# Patient Record
Sex: FEMALE | Race: WHITE | URBAN - METROPOLITAN AREA
[De-identification: names, ages, dates, MRNs, and addresses within clinical notes are randomized per-mention and may not be internally consistent; named-entity substitution may affect disease eponyms.]

---

## 2017-03-23 ENCOUNTER — TELEPHONE (OUTPATIENT)
Dept: FAMILY MEDICINE | Facility: CLINIC | Age: 32
End: 2017-03-23

## 2017-03-23 DIAGNOSIS — Z30.40 ENCOUNTER FOR SURVEILLANCE OF CONTRACEPTIVES: ICD-10-CM

## 2017-03-23 RX ORDER — LEVONORGESTREL/ETHIN.ESTRADIOL 0.1-0.02MG
1 TABLET ORAL DAILY
Qty: 84 TABLET | Refills: 2 | Status: SHIPPED | OUTPATIENT
Start: 2017-03-23 | End: 2017-11-07

## 2017-11-07 DIAGNOSIS — Z30.09 ENCOUNTER FOR OTHER GENERAL COUNSELING OR ADVICE ON CONTRACEPTION: ICD-10-CM

## 2017-11-13 RX ORDER — LEVONORGESTREL/ETHIN.ESTRADIOL 0.1-0.02MG
TABLET ORAL
Qty: 84 TABLET | Refills: 0 | Status: SHIPPED | OUTPATIENT
Start: 2017-11-13 | End: 2018-01-31

## 2017-11-13 NOTE — TELEPHONE ENCOUNTER
Left message to call back. Attempting to schedule office visit as patient is due. Routing to PCP.    Dr. Almanza,  Patient is due to be seen for medication review, ok to refill for the dispensed 84 days?  CAMILLA Duffy

## 2018-01-31 DIAGNOSIS — Z30.09 ENCOUNTER FOR OTHER GENERAL COUNSELING OR ADVICE ON CONTRACEPTION: ICD-10-CM

## 2018-01-31 RX ORDER — LEVONORGESTREL/ETHIN.ESTRADIOL 0.1-0.02MG
1 TABLET ORAL DAILY
Qty: 30 TABLET | Refills: 0 | Status: SHIPPED | OUTPATIENT
Start: 2018-01-31 | End: 2019-01-17 | Stop reason: SINTOL

## 2018-01-31 NOTE — TELEPHONE ENCOUNTER
"Requested Prescriptions   Pending Prescriptions Disp Refills     levonorgestrel-ethinyl estradiol (AVIANE,ALESSE,LESSINA) 0.1-20 MG-MCG per tablet [Pharmacy Med Name: L-JOANNA/ETH ES TABS 28'S 0.1/20]  Last Written Prescription Date:  11/13/2017  Last Fill Quantity: 84,  # refills: 0   Last Office Visit with [facility] provider:  12/19/2017   Future Office Visit:      84 tablet 0     Sig: TAKE 1 TABLET DAILY    Contraceptives Protocol Failed    1/31/2018 12:22 AM       Failed - Recent or future visit with authorizing provider's specialty    Patient had office visit in the last year or has a visit in the next 30 days with authorizing provider.  See \"Patient Info\" tab in inbasket, or \"Choose Columns\" in Meds & Orders section of the refill encounter.            Passed - Patient is not a current smoker if age is 35 or older       Passed - No active pregnancy on record       Passed - No positive pregnancy test in past 12 months        Fabrice Vincent RT (R)    "

## 2018-07-30 ENCOUNTER — TELEPHONE (OUTPATIENT)
Dept: FAMILY MEDICINE | Facility: CLINIC | Age: 33
End: 2018-07-30

## 2018-07-30 ENCOUNTER — NURSE TRIAGE (OUTPATIENT)
Dept: NURSING | Facility: CLINIC | Age: 33
End: 2018-07-30

## 2018-07-30 ENCOUNTER — OFFICE VISIT (OUTPATIENT)
Dept: FAMILY MEDICINE | Facility: CLINIC | Age: 33
End: 2018-07-30
Payer: COMMERCIAL

## 2018-07-30 VITALS
DIASTOLIC BLOOD PRESSURE: 86 MMHG | BODY MASS INDEX: 24.83 KG/M2 | TEMPERATURE: 100 F | RESPIRATION RATE: 18 BRPM | WEIGHT: 158.2 LBS | HEIGHT: 67 IN | SYSTOLIC BLOOD PRESSURE: 110 MMHG | HEART RATE: 116 BPM | OXYGEN SATURATION: 99 %

## 2018-07-30 DIAGNOSIS — R50.9 FEVER, UNSPECIFIED FEVER CAUSE: ICD-10-CM

## 2018-07-30 DIAGNOSIS — N30.00 ACUTE CYSTITIS WITHOUT HEMATURIA: ICD-10-CM

## 2018-07-30 DIAGNOSIS — B27.90 MONONUCLEOSIS: Primary | ICD-10-CM

## 2018-07-30 DIAGNOSIS — D69.6 TEMPORARY LOW PLATELET COUNT (H): ICD-10-CM

## 2018-07-30 LAB
ALBUMIN SERPL-MCNC: 3.4 G/DL (ref 3.4–5)
ALBUMIN UR-MCNC: 30 MG/DL
ALP SERPL-CCNC: 125 U/L (ref 40–150)
ALT SERPL W P-5'-P-CCNC: 104 U/L (ref 0–50)
ANION GAP SERPL CALCULATED.3IONS-SCNC: 6 MMOL/L (ref 3–14)
APPEARANCE UR: ABNORMAL
AST SERPL W P-5'-P-CCNC: 104 U/L (ref 0–45)
BACTERIA #/AREA URNS HPF: ABNORMAL /HPF
BASOPHILS # BLD AUTO: 0 10E9/L (ref 0–0.2)
BASOPHILS NFR BLD AUTO: 0.5 %
BILIRUB SERPL-MCNC: 0.4 MG/DL (ref 0.2–1.3)
BILIRUB UR QL STRIP: ABNORMAL
BUN SERPL-MCNC: 6 MG/DL (ref 7–30)
CALCIUM SERPL-MCNC: 8.2 MG/DL (ref 8.5–10.1)
CHLORIDE SERPL-SCNC: 105 MMOL/L (ref 94–109)
CO2 SERPL-SCNC: 26 MMOL/L (ref 20–32)
COLOR UR AUTO: ABNORMAL
CREAT SERPL-MCNC: 0.82 MG/DL (ref 0.52–1.04)
DIFFERENTIAL METHOD BLD: ABNORMAL
EOSINOPHIL # BLD AUTO: 0 10E9/L (ref 0–0.7)
EOSINOPHIL NFR BLD AUTO: 0 %
ERYTHROCYTE [DISTWIDTH] IN BLOOD BY AUTOMATED COUNT: 11.7 % (ref 10–15)
ERYTHROCYTE [SEDIMENTATION RATE] IN BLOOD BY WESTERGREN METHOD: 15 MM/H (ref 0–20)
GFR SERPL CREATININE-BSD FRML MDRD: 82 ML/MIN/1.7M2
GLUCOSE SERPL-MCNC: 76 MG/DL (ref 70–99)
GLUCOSE UR STRIP-MCNC: NEGATIVE MG/DL
HCT VFR BLD AUTO: 40.2 % (ref 35–47)
HETEROPH AB SER QL: POSITIVE
HGB BLD-MCNC: 13.6 G/DL (ref 11.7–15.7)
HGB UR QL STRIP: NEGATIVE
IMM GRANULOCYTES # BLD: 0 10E9/L (ref 0–0.4)
IMM GRANULOCYTES NFR BLD: 0.7 %
KETONES UR STRIP-MCNC: 15 MG/DL
LEUKOCYTE ESTERASE UR QL STRIP: ABNORMAL
LYMPHOCYTES # BLD AUTO: 1.6 10E9/L (ref 0.8–5.3)
LYMPHOCYTES NFR BLD AUTO: 35.3 %
MCH RBC QN AUTO: 30.2 PG (ref 26.5–33)
MCHC RBC AUTO-ENTMCNC: 33.8 G/DL (ref 31.5–36.5)
MCV RBC AUTO: 89 FL (ref 78–100)
MONOCYTES # BLD AUTO: 0.2 10E9/L (ref 0–1.3)
MONOCYTES NFR BLD AUTO: 5 %
MUCOUS THREADS #/AREA URNS LPF: PRESENT /LPF
NEUTROPHILS # BLD AUTO: 2.6 10E9/L (ref 1.6–8.3)
NEUTROPHILS NFR BLD AUTO: 58.5 %
NITRATE UR QL: NEGATIVE
NON-SQ EPI CELLS #/AREA URNS LPF: ABNORMAL /LPF
NRBC # BLD AUTO: 0 10*3/UL
NRBC BLD AUTO-RTO: 0 /100
PH UR STRIP: 6 PH (ref 5–7)
PLATELET # BLD AUTO: 110 10E9/L (ref 150–450)
PLATELET # BLD EST: ABNORMAL 10*3/UL
POTASSIUM SERPL-SCNC: 4.6 MMOL/L (ref 3.4–5.3)
PROT SERPL-MCNC: 7.5 G/DL (ref 6.8–8.8)
RBC # BLD AUTO: 4.5 10E12/L (ref 3.8–5.2)
RBC #/AREA URNS AUTO: ABNORMAL /HPF
SODIUM SERPL-SCNC: 137 MMOL/L (ref 133–144)
SOURCE: ABNORMAL
SP GR UR STRIP: 1.03 (ref 1–1.03)
UROBILINOGEN UR STRIP-ACNC: 1 EU/DL (ref 0.2–1)
VARIANT LYMPHS BLD QL SMEAR: PRESENT
WBC # BLD AUTO: 4.4 10E9/L (ref 4–11)
WBC #/AREA URNS AUTO: ABNORMAL /HPF

## 2018-07-30 PROCEDURE — 85025 COMPLETE CBC W/AUTO DIFF WBC: CPT | Performed by: NURSE PRACTITIONER

## 2018-07-30 PROCEDURE — 87086 URINE CULTURE/COLONY COUNT: CPT | Performed by: NURSE PRACTITIONER

## 2018-07-30 PROCEDURE — 86308 HETEROPHILE ANTIBODY SCREEN: CPT | Performed by: NURSE PRACTITIONER

## 2018-07-30 PROCEDURE — 85652 RBC SED RATE AUTOMATED: CPT | Performed by: NURSE PRACTITIONER

## 2018-07-30 PROCEDURE — 99214 OFFICE O/P EST MOD 30 MIN: CPT | Performed by: NURSE PRACTITIONER

## 2018-07-30 PROCEDURE — 86618 LYME DISEASE ANTIBODY: CPT | Performed by: NURSE PRACTITIONER

## 2018-07-30 PROCEDURE — 80053 COMPREHEN METABOLIC PANEL: CPT | Performed by: NURSE PRACTITIONER

## 2018-07-30 PROCEDURE — 36415 COLL VENOUS BLD VENIPUNCTURE: CPT | Performed by: NURSE PRACTITIONER

## 2018-07-30 PROCEDURE — 81001 URINALYSIS AUTO W/SCOPE: CPT | Performed by: NURSE PRACTITIONER

## 2018-07-30 RX ORDER — CEPHALEXIN 500 MG/1
500 CAPSULE ORAL 2 TIMES DAILY
Qty: 20 CAPSULE | Refills: 0 | Status: SHIPPED | OUTPATIENT
Start: 2018-07-30 | End: 2019-01-17

## 2018-07-30 NOTE — LETTER
[facility]  [address] [place] [id number]  Phone: [phone]  Fax: [phone]    July 30, 2018        Mahogany Sainz  [address] [place] [id number]          To whom it may concern:    RE: Mahogany Sainz    Patient was seen and treated today at our clinic and missed work.    Can return to work once fever free.    Please contact me for questions or concerns.      Sincerely,        TRISTEN Isaac CNP

## 2018-07-30 NOTE — TELEPHONE ENCOUNTER
Mahogany seen in clinic today per ISAAC RAMON CNP and diagnosed with both UTI and mono.  Mahogany concerned, states she forgot to tell the provider she has had a previous diagnosis of mono.  Wondering if she could be pregnant ?  Requesting a pregnancy test be added on to labs ?  Patient prefers this over getting a home pregnancy kit.  Can you please f/u with Mahogany, if this is possible and if it is or is not ordered?      Thanks  Sheree Rios RN  FNA

## 2018-07-30 NOTE — PATIENT INSTRUCTIONS
"Return the next week to to 1 to 2 weeks for repeat platelet count was noted to be mildly low today we will need to repeat that most likely related to the mononucleosis.    Antibiotics as directed   Urine culture is pending, will contact you if there is a change in treatment therapy.   Drink plenty of fluids. Prevention and treatment of UTI's discussed.   Signs and symptoms of pyelonephritis mentioned.   RTC if any worsening symptoms or if not improving as expected.   Follow-up with your primary care provider next week and as needed.    Indications for emergent return to emergency department discussed with patient, who verbalized good understanding and agreement.  Patient understands the limitations of today's evaluation.            * BLADDER INFECTION,Female (Adult)    A bladder infection (\"cystitis\" or \"UTI\") usually causes a constant urge to urinate and a burning when passing urine. Urine may be cloudy, smelly or dark. There may be pain in the lower abdomen. A bladder infection occurs when bacteria from the vaginal area enter the bladder opening (urethra). This can occur from sexual intercourse, wearing tight clothing, dehydration and other factors.  HOME CARE:  1. Drink lots of fluids (at least 6-8 glasses a day, unless you must restrict fluids for other medical reasons). This will force the medicine into your urinary system and flush the bacteria out of your body. Cranberry juice has been shown to help clear out the bacteria.  2. Avoid sexual intercourse until your symptoms are gone.  3. A bladder infection is treated with antibiotics. You may also be given Pyridium (generic = phenazopyridine) to reduce the burning sensation. This medicine will cause your urine to become a bright orange color. The orange urine may stain clothing. You may wear a pad or panty-liner to protect clothing.  PREVENTING FUTURE INFECTIONS:  1. Always wipe from front to back after a bowel movement.  2. Keep the genital area clean and " dry.  3. Drink plenty of fluids each day to avoid dehydration.  4. Urinate right after intercourse to flush out the bladder.  5. Wear cotton underwear and cotton-lined panty hose; avoid tight-fitting pants.  6. If you are on birth control pills and are having frequent bladder infections, discuss with your doctor.  FOLLOW UP: Return to this facility or see your doctor if ALL symptoms are not gone after three days of treatment.  GET PROMPT MEDICAL ATTENTION if any of the following occur:    Fever over 101 F (38.3 C)    No improvement by the third day of treatment    Increasing back or abdominal pain    Repeated vomiting; unable to keep medicine down    Weakness, dizziness or fainting    Vaginal discharge    Pain, redness or swelling in the labia (outer vaginal area)    3018-0005 [employer]. [address] [place] [address], [place] [id number]. All rights reserved. This information is not intended as a substitute for professional medical care. Always follow your healthcare professional's instructions.  This information has been modified by your health care provider with permission from the publisher.    Mononucleosis (Blood)  Does this test have other names?  Mono test, monospot test, Sudhakar-Barr test   What is this test?  This test looks for signs in your blood that you have the Sudhakar-Barr virus.  The Sudhakar-Barr virus (EBV) is a common virus that's part of the herpes virus family. It causes infectious mononucleosis, or mono. Mono is passed from person to person through saliva. Symptoms usually appear within 4 to 6 weeks after exposure and ease in 1 to 2 months.  If you have mono, you may have a high level of a type of white blood cell called a lymphocyte in your blood. Your immune system also will make heterophile antibodies to fight off the EBV. These antibodies will also appear in your blood if you have mono.  Why do I need this test?  You may need this blood test if your healthcare provider suspects that  you have mononucleosis. Signs and symptoms include:    Fatigue or exhaustion    Enlarged spleen    Swollen glands    Sore throat    Fever  What other tests might I have along with this test?  Your healthcare provider may also order a test for EBV-specific antibodies to confirm the results of your blood test and get a definitive mono diagnosis.  Your healthcare provider may also order a complete blood count (CBC) and chest X-ray.  What do my test results mean?  Test results may vary depending on your age, gender, health history, the method used for the test, and other things. Your test results may not mean you have a problem. Ask your healthcare provider what your test results mean for you.   Results are given in micrograms per liter (mcg/L). Normal ranges for lymphocytes are 1,000 to 4,800 mcg/L. The normal value for heterophile antibodies is zero.  If you have high levels of lymphocytes and heterophile antibodies are found, it means that you may have mononucleosis.  How is this test done?  The test is done with a blood sample. A needle is used to draw blood from a vein in your arm or hand.   Does this test pose any risks?  Having a blood test with a needle carries some risks. These include bleeding, infection, bruising, and feeling lightheaded. When the needle pricks your arm or hand, you may feel a slight sting or pain. Afterward, the site may be sore.   What might affect my test results?  HIV, lupus, lymphoma, rubella, hepatitis, and other viral infections may cause a false-positive result.  How do I get ready for this test?  You don't need to prepare for this test. Be sure your healthcare provider knows about all medicines, herbs, vitamins, and supplements you are taking. This includes medicines that don't need a prescription and any illicit drugs you may use.    2652-2372 [employer]. [address] [place] [address], [place] [address]. All rights reserved. This information is not intended as a substitute  for professional medical care. Always follow your healthcare professional's instructions.        Mononucleosis  Mononucleosis (also called mono) is a contagious viral infection. Most infants and children exposed to the virus get only mild flu-like symptoms or no symptoms at all. However, infection is usually more serious in teens and young adults. While the virus is active it causes symptoms and can spread to others. After symptoms subside, the virus stays in the body and eventually becomes inactive. Once you have one case of mono, you are unlikely to develop symptoms again.  The virus is usually spread by contact with saliva, often by kissing. It may also spread by breastmilk, blood, or sexual contact. It takes about 4 to 6 weeks to develop symptoms after exposure.  Early symptoms include headache, nausea, tiredness and general muscle aching. This is followed by sore throat and fever. Lymph glands in the neck, under the arms, or in the groin may be swollen. Symptoms usually go away in about 1 to 2 months. But they can last up to four months.  If symptoms have been present less than 1 week or more than 3 weeks, the blood test used to diagnose this disease may be negative even though you have the illness.  In this case, other tests may be done.  Taking the antibiotics ampicillin or amoxicillin during a mono infection may cause a skin rash. This is not serious and will fade in about one week. The cause is a reaction of the drug with the virus.  Mono can cause your spleen to swell. The spleen is a fist-sized organ in the upper left abdomen that stores red blood cells. Injury to a swollen spleen can cause the spleen to rupture. This can cause life-threatening internal bleeding. To avoid this, do not play contact sports or perform strenuous activity for 8 weeks, or until cleared by your healthcare provider. A sharp blow could rupture a swollen spleen  Home care    Rest in bed until the fever and weakness have gone  away.    Drink plenty of fluids, but avoid alcohol. Otherwise, you may eat a regular diet.    Ask your healthcare provider about using over-the-counter medicines to treat symptoms such as fever, pain, or an itchy rash.    Over-the-counter throat lozenges may help soothe a sore throat. Gargling with warm salt water (1/2 teaspoon in 1 glass of warm water) may also be soothing to the throat.    You may return to work or school after the fever goes away and you are feeling better. Continue to follow any activity restrictions you have been given.  Preventing spread of the virus  To limit the spread of the virus, avoid exposing others to your saliva for at least 6 months after your illness (no kissing or sharing utensils, drinking glasses, or toothbrushes).  Follow-up care  Follow up with your healthcare provider within 1 to 2 weeks or as advised by our staff to be sure that there are no complications. If symptoms of extreme fatigue and swollen glands last longer than 6 months, see your healthcare provider for further testing.  When to seek medical advice  Call your healthcare provider right away if any of the following occur:    Excessive coughing    Yellow skin or eyes    Trouble swallowing  Call 911  Call 911 if any of the following occur:    Severe or worsening abdominal pain    Trouble breathing  Date Last Reviewed: 9/25/2015 2000-2017 [employer]. [address] [place] [address], [place] [address]. All rights reserved. This information is not intended as a substitute for professional medical care. Always follow your healthcare professional's instructions.

## 2018-07-30 NOTE — MR AVS SNAPSHOT
"              After Visit Summary   7/30/2018    Mahogany Sainz    MRN: [id number]           Patient Information     Date Of Birth          1985        Visit Information        Provider Department      7/30/2018 11:40 AM Celi Larose APRN [facility]        Today's Diagnoses     Fever, unspecified fever cause    -  1    Acute cystitis without hematuria        Mononucleosis        Temporary low platelet count (H)          Care Instructions    Return the next week to to 1 to 2 weeks for repeat platelet count was noted to be mildly low today we will need to repeat that most likely related to the mononucleosis.    Antibiotics as directed   Urine culture is pending, will contact you if there is a change in treatment therapy.   Drink plenty of fluids. Prevention and treatment of UTI's discussed.   Signs and symptoms of pyelonephritis mentioned.   RTC if any worsening symptoms or if not improving as expected.   Follow-up with your primary care provider next week and as needed.    Indications for emergent return to emergency department discussed with patient, who verbalized good understanding and agreement.  Patient understands the limitations of today's evaluation.            * BLADDER INFECTION,Female (Adult)    A bladder infection (\"cystitis\" or \"UTI\") usually causes a constant urge to urinate and a burning when passing urine. Urine may be cloudy, smelly or dark. There may be pain in the lower abdomen. A bladder infection occurs when bacteria from the vaginal area enter the bladder opening (urethra). This can occur from sexual intercourse, wearing tight clothing, dehydration and other factors.  HOME CARE:  1. Drink lots of fluids (at least 6-8 glasses a day, unless you must restrict fluids for other medical reasons). This will force the medicine into your urinary system and flush the bacteria out of your body. Cranberry juice has been shown to help clear out the bacteria.  2. Avoid " sexual intercourse until your symptoms are gone.  3. A bladder infection is treated with antibiotics. You may also be given Pyridium (generic = phenazopyridine) to reduce the burning sensation. This medicine will cause your urine to become a bright orange color. The orange urine may stain clothing. You may wear a pad or panty-liner to protect clothing.  PREVENTING FUTURE INFECTIONS:  1. Always wipe from front to back after a bowel movement.  2. Keep the genital area clean and dry.  3. Drink plenty of fluids each day to avoid dehydration.  4. Urinate right after intercourse to flush out the bladder.  5. Wear cotton underwear and cotton-lined panty hose; avoid tight-fitting pants.  6. If you are on birth control pills and are having frequent bladder infections, discuss with your doctor.  FOLLOW UP: Return to this facility or see your doctor if ALL symptoms are not gone after three days of treatment.  GET PROMPT MEDICAL ATTENTION if any of the following occur:    Fever over 101 F (38.3 C)    No improvement by the third day of treatment    Increasing back or abdominal pain    Repeated vomiting; unable to keep medicine down    Weakness, dizziness or fainting    Vaginal discharge    Pain, redness or swelling in the labia (outer vaginal area)    9581-0903 [employer]. [address] [place] [address], [place] [address]. All rights reserved. This information is not intended as a substitute for professional medical care. Always follow your healthcare professional's instructions.  This information has been modified by your health care provider with permission from the publisher.    Mononucleosis (Blood)  Does this test have other names?  Mono test, monospot test, Sudhakar-Barr test   What is this test?  This test looks for signs in your blood that you have the Sudhakar-Barr virus.  The Sudhakar-Barr virus (EBV) is a common virus that's part of the herpes virus family. It causes infectious mononucleosis, or mono. Mono is  passed from person to person through saliva. Symptoms usually appear within 4 to 6 weeks after exposure and ease in 1 to 2 months.  If you have mono, you may have a high level of a type of white blood cell called a lymphocyte in your blood. Your immune system also will make heterophile antibodies to fight off the EBV. These antibodies will also appear in your blood if you have mono.  Why do I need this test?  You may need this blood test if your healthcare provider suspects that you have mononucleosis. Signs and symptoms include:    Fatigue or exhaustion    Enlarged spleen    Swollen glands    Sore throat    Fever  What other tests might I have along with this test?  Your healthcare provider may also order a test for EBV-specific antibodies to confirm the results of your blood test and get a definitive mono diagnosis.  Your healthcare provider may also order a complete blood count (CBC) and chest X-ray.  What do my test results mean?  Test results may vary depending on your age, gender, health history, the method used for the test, and other things. Your test results may not mean you have a problem. Ask your healthcare provider what your test results mean for you.   Results are given in micrograms per liter (mcg/L). Normal ranges for lymphocytes are 1,000 to 4,800 mcg/L. The normal value for heterophile antibodies is zero.  If you have high levels of lymphocytes and heterophile antibodies are found, it means that you may have mononucleosis.  How is this test done?  The test is done with a blood sample. A needle is used to draw blood from a vein in your arm or hand.   Does this test pose any risks?  Having a blood test with a needle carries some risks. These include bleeding, infection, bruising, and feeling lightheaded. When the needle pricks your arm or hand, you may feel a slight sting or pain. Afterward, the site may be sore.   What might affect my test results?  HIV, lupus, lymphoma, rubella, hepatitis, and  other viral infections may cause a false-positive result.  How do I get ready for this test?  You don't need to prepare for this test. Be sure your healthcare provider knows about all medicines, herbs, vitamins, and supplements you are taking. This includes medicines that don't need a prescription and any illicit drugs you may use.    4580-7704 [employer]. [address] [place] [address]. All rights reserved. This information is not intended as a substitute for professional medical care. Always follow your healthcare professional's instructions.        Mononucleosis  Mononucleosis (also called mono) is a contagious viral infection. Most infants and children exposed to the virus get only mild flu-like symptoms or no symptoms at all. However, infection is usually more serious in teens and young adults. While the virus is active it causes symptoms and can spread to others. After symptoms subside, the virus stays in the body and eventually becomes inactive. Once you have one case of mono, you are unlikely to develop symptoms again.  The virus is usually spread by contact with saliva, often by kissing. It may also spread by breastmilk, blood, or sexual contact. It takes about 4 to 6 weeks to develop symptoms after exposure.  Early symptoms include headache, nausea, tiredness and general muscle aching. This is followed by sore throat and fever. Lymph glands in the neck, under the arms, or in the groin may be swollen. Symptoms usually go away in about 1 to 2 months. But they can last up to four months.  If symptoms have been present less than 1 week or more than 3 weeks, the blood test used to diagnose this disease may be negative even though you have the illness.  In this case, other tests may be done.  Taking the antibiotics ampicillin or amoxicillin during a mono infection may cause a skin rash. This is not serious and will fade in about one week. The cause is a reaction of the drug with the  virus.  Mono can cause your spleen to swell. The spleen is a fist-sized organ in the upper left abdomen that stores red blood cells. Injury to a swollen spleen can cause the spleen to rupture. This can cause life-threatening internal bleeding. To avoid this, do not play contact sports or perform strenuous activity for 8 weeks, or until cleared by your healthcare provider. A sharp blow could rupture a swollen spleen  Home care    Rest in bed until the fever and weakness have gone away.    Drink plenty of fluids, but avoid alcohol. Otherwise, you may eat a regular diet.    Ask your healthcare provider about using over-the-counter medicines to treat symptoms such as fever, pain, or an itchy rash.    Over-the-counter throat lozenges may help soothe a sore throat. Gargling with warm salt water (1/2 teaspoon in 1 glass of warm water) may also be soothing to the throat.    You may return to work or school after the fever goes away and you are feeling better. Continue to follow any activity restrictions you have been given.  Preventing spread of the virus  To limit the spread of the virus, avoid exposing others to your saliva for at least 6 months after your illness (no kissing or sharing utensils, drinking glasses, or toothbrushes).  Follow-up care  Follow up with your healthcare provider within 1 to 2 weeks or as advised by our staff to be sure that there are no complications. If symptoms of extreme fatigue and swollen glands last longer than 6 months, see your healthcare provider for further testing.  When to seek medical advice  Call your healthcare provider right away if any of the following occur:    Excessive coughing    Yellow skin or eyes    Trouble swallowing  Call 911  Call 911 if any of the following occur:    Severe or worsening abdominal pain    Trouble breathing  Date Last Reviewed: 9/25/2015 2000-2017 [employer]. [address] [place] [address], [place] [address]. All rights reserved. This  "information is not intended as a substitute for professional medical care. Always follow your healthcare professional's instructions.                Follow-ups after your visit        Future tests that were ordered for you today     Open Future Orders        Priority Expected Expires Ordered    **CBC with platelets FUTURE 14d Routine 8/6/2018 8/13/2018 7/30/2018            Who to contact     If you have questions or need follow up information about today's clinic visit or your schedule please contact [facility] directly at [phone].  Normal or non-critical lab and imaging results will be communicated to you by MyChart, letter or phone within 4 business days after the clinic has received the results. If you do not hear from us within 7 days, please contact the clinic through MyChart or phone. If you have a critical or abnormal lab result, we will notify you by phone as soon as possible.  Submit refill requests through [employer] or call your pharmacy and they will forward the refill request to us. Please allow 3 business days for your refill to be completed.          Additional Information About Your Visit        Care EveryWhere ID     This is your Care EveryWhere ID. This could be used by other organizations to access your [facility] medical records  [id number]        Your Vitals Were     Pulse Temperature Respirations Height Last Period Pulse Oximetry    116 100  F (37.8  C) (Tympanic) 18 5' 7.25\" (1.708 m) 07/15/2018 (Approximate) 99%    BMI (Body Mass Index)                   24.59 kg/m2            Blood Pressure from Last 3 Encounters:   07/30/18 110/86   12/19/16 134/88   12/01/15 128/78    Weight from Last 3 Encounters:   07/30/18 158 lb 3.2 oz (71.8 kg)   12/19/16 183 lb (83 kg)   12/01/15 175 lb (79.4 kg)              We Performed the Following     CBC with platelets differential     Comprehensive metabolic panel     ESR: Erythrocyte sedimentation rate     Lyme Disease Essence with reflex to " WB Serum     Mononucleosis screen     UA reflex to Microscopic and Culture     Urine Culture Aerobic Bacterial     Urine Microscopic          Today's Medication Changes          These changes are accurate as of 7/30/18 12:49 PM.  If you have any questions, ask your nurse or doctor.               Start taking these medicines.        Dose/Directions    cephALEXin 500 MG capsule   Commonly known as:  KEFLEX   Used for:  Mononucleosis   Started by:  Celi Larose APRN CNP        Dose:  500 mg   Take 1 capsule (500 mg) by mouth 2 times daily   Quantity:  20 capsule   Refills:  0            Where to get your medicines      These medications were sent to [id number] [address] [facility]  [address] [place] [address]    Hours:  Closed 10-16-08 business to [facility] Phone:  [phone]     cephALEXin 500 MG capsule                Primary Care Provider Office Phone # Fax #    [facility] [phone] [address] [place] [address]        Equal Access to Services     JEANNA PALACIOS AH: Hadii parag birch hadasho Soomaali, waaxda luqadaha, qaybta kaalmada adeegyada, waxay susanin penny beebe . So [facility] [phone].    ATENCIÓN: Si miguel angel mari, tiene a roy disposición servicios gratuitos de asistencia lingüística. Llame al [phone].    We comply with applicable federal civil rights laws and [place] laws. We do not discriminate on the basis of race, color, national origin, age, disability, sex, sexual orientation, or gender identity.            Thank you!     Thank you for choosing [facility]  for your care. Our goal is always to provide you with excellent care. Hearing back from our patients is one way we can continue to improve our services. Please take a few minutes to complete the written survey that you may receive in the mail after your visit with us. Thank you!             Your Updated Medication  List - Protect others around you: Learn how to safely use, store and throw away your medicines at www.disposemymeds.org.          This list is accurate as of 7/30/18 12:49 PM.  Always use your most recent med list.                   Brand Name Dispense Instructions for use Diagnosis    atovaquone-proguanil 250-100 MG per tablet    MALARONE    50 tablet    Take 1 tablet by mouth daily Start taking 1-2 days before travel and then all through the stay in country and 7 days on return.    Travel advice encounter       cephALEXin 500 MG capsule    KEFLEX    20 capsule    Take 1 capsule (500 mg) by mouth 2 times daily    Mononucleosis       levonorgestrel-ethinyl estradiol 0.1-20 MG-MCG per tablet    AVIANE,ROXY,LESSINA    30 tablet    Take 1 tablet by mouth daily (Needs follow-up appointment for this medication)    Encounter for other general counseling or advice on contraception       * nicotine 21 MG/24HR 24 hr patch    NICODERM CQ    30 patch    Place 1 patch onto the skin every 24 hours        * nicotine 14 MG/24HR 24 hr patch    NICODERM CQ    30 patch    Place 1 patch onto the skin every 24 hours    Encounter for smoking cessation counseling       nicotine polacrilex 2 MG lozenge    [employer] NICOTINE POLACRILEX    360 lozenge    Place 1 lozenge (2 mg) inside cheek every hour as needed for smoking cessation    Encounter for smoking cessation counseling       typhoid CR capsule    VIVOTIF    4 capsule    Take 1 capsule by mouth every other day    Travel advice encounter       * Notice:  This list has 2 medication(s) that are the same as other medications prescribed for you. Read the directions carefully, and ask your doctor or other care provider to review them with you.

## 2018-07-30 NOTE — NURSING NOTE
"Chief Complaint   Patient presents with     Fever       Initial /86  Pulse 116  Temp 100  F (37.8  C) (Tympanic)  Resp 18  Ht 5' 7.25\" (1.708 m)  Wt 158 lb 3.2 oz (71.8 kg)  LMP 07/15/2018 (Approximate)  SpO2 99%  BMI 24.59 kg/m2 Estimated body mass index is 24.59 kg/(m^2) as calculated from the following:    Height as of this encounter: 5' 7.25\" (1.708 m).    Weight as of this encounter: 158 lb 3.2 oz (71.8 kg).      Health Maintenance that is potentially due pending provider review:  NONE    n/a    Is there anyone who you would like to be able to receive your results? No  If yes have patient fill out LUZ      "

## 2018-07-30 NOTE — TELEPHONE ENCOUNTER
Seen in clinic today, message routed to treating provider.  See telephone encounter.    Reason for Disposition    [1] Caller requesting NON-URGENT health information AND [2] PCP's office is the best resource    Protocols used: INFORMATION ONLY CALL-ADULT-

## 2018-07-30 NOTE — PROGRESS NOTES
SUBJECTIVE:   Mahogany Sainz is a 32 year old female who presents to clinic today for the following health issues:      Fever       Duration: Thursday night     Description (location/character/radiation): fever     Intensity:  moderate, severe    Accompanying signs and symptoms: chills/sweats, body aches, no sore throat, some nausea the first day, no congestion or runny nose, no ear pain, headache, fatigue, body pain     History (similar episodes/previous evaluation): None    Precipitating or alleviating factors: None    Therapies tried and outcome: tylenol, naproxen          Problem list and histories reviewed & adjusted, as indicated.  Additional history: as documented    Patient Active Problem List   Diagnosis     CARDIOVASCULAR SCREENING; LDL GOAL LESS THAN 160     History reviewed. No pertinent surgical history.    Social History   Substance Use Topics     Smoking status: Former Smoker     Smokeless tobacco: Never Used     Alcohol use Yes      Comment: light     Family History   Problem Relation Age of Onset     Hyperlipidemia Mother      Hypertension Mother      Hypertension Father      Hyperlipidemia Brother      Depression Sister      Other Cancer Cousin      ovarian ca     Other Cancer Other 45     ovarian ca         Current Outpatient Prescriptions   Medication Sig Dispense Refill     atovaquone-proguanil (MALARONE) 250-100 MG per tablet Take 1 tablet by mouth daily Start taking 1-2 days before travel and then all through the stay in country and 7 days on return. (Patient not taking: Reported on 7/30/2018) 50 tablet 1     levonorgestrel-ethinyl estradiol (AVIANE,ALESSE,LESSINA) 0.1-20 MG-MCG per tablet Take 1 tablet by mouth daily (Needs follow-up appointment for this medication) 30 tablet 0     nicotine (NICODERM CQ) 14 MG/24HR patch 2h hr Place 1 patch onto the skin every 24 hours (Patient not taking: Reported on 7/30/2018) 30 patch 2     nicotine (NICODERM CQ) 21 MG/24HR patch 2h hr Place 1 patch  "onto the skin every 24 hours 30 patch      nicotine polacrilex ([facility] NICOTINE POLACRILEX) 2 MG lozenge Place 1 lozenge (2 mg) inside cheek every hour as needed for smoking cessation (Patient not taking: Reported on 7/30/2018) 360 lozenge 1     typhoid (VIVOTIF) CR capsule Take 1 capsule by mouth every other day (Patient not taking: Reported on 7/30/2018) 4 capsule 0     No Known Allergies    Reviewed and updated as needed this visit by clinical staff       Reviewed and updated as needed this visit by Provider         ROS:  Constitutional, HEENT, cardiovascular, pulmonary, gi and gu systems are negative, except as otherwise noted.    OBJECTIVE:     /86  Pulse 116  Temp 100  F (37.8  C) (Tympanic)  Resp 18  Ht 5' 7.25\" (1.708 m)  Wt 158 lb 3.2 oz (71.8 kg)  LMP 07/15/2018 (Approximate)  SpO2 99%  BMI 24.59 kg/m2  Body mass index is 24.59 kg/(m^2).  GENERAL: healthy, alert and no distress  EYES: Eyes grossly normal to inspection, PERRL and conjunctivae and sclerae normal  HENT: ear canals and TM's normal, nose and mouth without ulcers or lesions  NECK: no adenopathy, no asymmetry, masses, or scars and thyroid normal to palpation  RESP: lungs clear to auscultation - no rales, rhonchi or wheezes  CV: regular rate and rhythm, normal S1 S2, no S3 or S4, no murmur, click or rub, no peripheral edema and peripheral pulses strong  ABDOMEN: soft, nontender, no hepatosplenomegaly, no masses and bowel sounds normal  MS: no gross musculoskeletal defects noted, no edema  SKIN: no suspicious lesions or rashes  NEURO: Normal strength and tone, mentation intact and speech normal  PSYCH: mentation appears normal, affect normal/bright      Results for orders placed or performed in visit on 07/30/18   UA reflex to Microscopic and Culture   Result Value Ref Range    Color Urine Straw     Appearance Urine Slightly Cloudy     Glucose Urine Negative NEG^Negative mg/dL    Bilirubin Urine Small (A) NEG^Negative    Ketones Urine 15 " (A) NEG^Negative mg/dL    Specific Gravity Urine 1.030 1.003 - 1.035    Blood Urine Negative NEG^Negative    pH Urine 6.0 5.0 - 7.0 pH    Protein Albumin Urine 30 (A) NEG^Negative mg/dL    Urobilinogen Urine 1.0 0.2 - 1.0 EU/dL    Nitrite Urine Negative NEG^Negative    Leukocyte Esterase Urine Small (A) NEG^Negative    Source Midstream Urine    Mononucleosis screen   Result Value Ref Range    Mononucleosis Screen Positive (A) NEG^Negative   Urine Microscopic   Result Value Ref Range    WBC Urine 5-10 (A) OTO5^0 - 5 /HPF    RBC Urine O - 2 OTO2^O - 2 /HPF    Squamous Epithelial /LPF Urine Many (A) FEW^Few /LPF    Bacteria Urine Few (A) NEG^Negative /HPF    Mucous Urine Present (A) NEG^Negative /LPF       ASSESSMENT/PLAN:       ICD-10-CM    1. Mononucleosis B27.90 cephALEXin (KEFLEX) 500 MG capsule   2. Acute cystitis without hematuria N30.00 Urine Culture Aerobic Bacterial   3. Temporary low platelet count (H) D69.6 **CBC with platelets FUTURE 14d   4. Fever, unspecified fever cause R50.9 UA reflex to Microscopic and Culture     CBC with platelets differential     Mononucleosis screen     Lyme Disease Essence with reflex to WB Serum     ESR: Erythrocyte sedimentation rate     Comprehensive metabolic panel     Urine Microscopic     Patient's mono was positive patient also has positive white blood cells in the urine contamination versus UTI does have general symptoms of UTI will start treatment with Keflex for the UTI if he culture negative will discontinue the Keflex.  There is also noted that she is mildly low platelets will recommend having a repeat the platelets in 1-2 weeks lab only visit low platelets and most likely related to mononucleosis but will again monitor closely.  Liver enzymes mildly elevated again we will recheck those at the time we recheck the platelets are related to the mono.      Patient Instructions   Return the next week to to 1 to 2 weeks for repeat platelet count was noted to be mildly low today  "we will need to repeat that most likely related to the mononucleosis.    Antibiotics as directed   Urine culture is pending, will contact you if there is a change in treatment therapy.   Drink plenty of fluids. Prevention and treatment of UTI's discussed.   Signs and symptoms of pyelonephritis mentioned.   RTC if any worsening symptoms or if not improving as expected.   Follow-up with your primary care provider next week and as needed.    Indications for emergent return to emergency department discussed with patient, who verbalized good understanding and agreement.  Patient understands the limitations of today's evaluation.            * BLADDER INFECTION,Female (Adult)    A bladder infection (\"cystitis\" or \"UTI\") usually causes a constant urge to urinate and a burning when passing urine. Urine may be cloudy, smelly or dark. There may be pain in the lower abdomen. A bladder infection occurs when bacteria from the vaginal area enter the bladder opening (urethra). This can occur from sexual intercourse, wearing tight clothing, dehydration and other factors.  HOME CARE:  1. Drink lots of fluids (at least 6-8 glasses a day, unless you must restrict fluids for other medical reasons). This will force the medicine into your urinary system and flush the bacteria out of your body. Cranberry juice has been shown to help clear out the bacteria.  2. Avoid sexual intercourse until your symptoms are gone.  3. A bladder infection is treated with antibiotics. You may also be given Pyridium (generic = phenazopyridine) to reduce the burning sensation. This medicine will cause your urine to become a bright orange color. The orange urine may stain clothing. You may wear a pad or panty-liner to protect clothing.  PREVENTING FUTURE INFECTIONS:  1. Always wipe from front to back after a bowel movement.  2. Keep the genital area clean and dry.  3. Drink plenty of fluids each day to avoid dehydration.  4. Urinate right after intercourse to " flush out the bladder.  5. Wear cotton underwear and cotton-lined panty hose; avoid tight-fitting pants.  6. If you are on birth control pills and are having frequent bladder infections, discuss with your doctor.  FOLLOW UP: Return to this facility or see your doctor if ALL symptoms are not gone after three days of treatment.  GET PROMPT MEDICAL ATTENTION if any of the following occur:    Fever over 101 F (38.3 C)    No improvement by the third day of treatment    Increasing back or abdominal pain    Repeated vomiting; unable to keep medicine down    Weakness, dizziness or fainting    Vaginal discharge    Pain, redness or swelling in the labia (outer vaginal area)    2073-0570 [employer]. [address] [place] [address], [place] [address]. All rights reserved. This information is not intended as a substitute for professional medical care. Always follow your healthcare professional's instructions.  This information has been modified by your health care provider with permission from the publisher.    Mononucleosis (Blood)  Does this test have other names?  Mono test, monospot test, Sudhakar-Barr test   What is this test?  This test looks for signs in your blood that you have the Sudhakar-Barr virus.  The Sudhakar-Barr virus (EBV) is a common virus that's part of the herpes virus family. It causes infectious mononucleosis, or mono. Mono is passed from person to person through saliva. Symptoms usually appear within 4 to 6 weeks after exposure and ease in 1 to 2 months.  If you have mono, you may have a high level of a type of white blood cell called a lymphocyte in your blood. Your immune system also will make heterophile antibodies to fight off the EBV. These antibodies will also appear in your blood if you have mono.  Why do I need this test?  You may need this blood test if your healthcare provider suspects that you have mononucleosis. Signs and symptoms include:    Fatigue or exhaustion    Enlarged  spleen    Swollen glands    Sore throat    Fever  What other tests might I have along with this test?  Your healthcare provider may also order a test for EBV-specific antibodies to confirm the results of your blood test and get a definitive mono diagnosis.  Your healthcare provider may also order a complete blood count (CBC) and chest X-ray.  What do my test results mean?  Test results may vary depending on your age, gender, health history, the method used for the test, and other things. Your test results may not mean you have a problem. Ask your healthcare provider what your test results mean for you.   Results are given in micrograms per liter (mcg/L). Normal ranges for lymphocytes are 1,000 to 4,800 mcg/L. The normal value for heterophile antibodies is zero.  If you have high levels of lymphocytes and heterophile antibodies are found, it means that you may have mononucleosis.  How is this test done?  The test is done with a blood sample. A needle is used to draw blood from a vein in your arm or hand.   Does this test pose any risks?  Having a blood test with a needle carries some risks. These include bleeding, infection, bruising, and feeling lightheaded. When the needle pricks your arm or hand, you may feel a slight sting or pain. Afterward, the site may be sore.   What might affect my test results?  HIV, lupus, lymphoma, rubella, hepatitis, and other viral infections may cause a false-positive result.  How do I get ready for this test?  You don't need to prepare for this test. Be sure your healthcare provider knows about all medicines, herbs, vitamins, and supplements you are taking. This includes medicines that don't need a prescription and any illicit drugs you may use.    1353-9949 [employer]. [address] [place] [id number]. All rights reserved. This information is not intended as a substitute for professional medical care. Always follow your healthcare professional's  instructions.        Mononucleosis  Mononucleosis (also called mono) is a contagious viral infection. Most infants and children exposed to the virus get only mild flu-like symptoms or no symptoms at all. However, infection is usually more serious in teens and young adults. While the virus is active it causes symptoms and can spread to others. After symptoms subside, the virus stays in the body and eventually becomes inactive. Once you have one case of mono, you are unlikely to develop symptoms again.  The virus is usually spread by contact with saliva, often by kissing. It may also spread by breastmilk, blood, or sexual contact. It takes about 4 to 6 weeks to develop symptoms after exposure.  Early symptoms include headache, nausea, tiredness and general muscle aching. This is followed by sore throat and fever. Lymph glands in the neck, under the arms, or in the groin may be swollen. Symptoms usually go away in about 1 to 2 months. But they can last up to four months.  If symptoms have been present less than 1 week or more than 3 weeks, the blood test used to diagnose this disease may be negative even though you have the illness.  In this case, other tests may be done.  Taking the antibiotics ampicillin or amoxicillin during a mono infection may cause a skin rash. This is not serious and will fade in about one week. The cause is a reaction of the drug with the virus.  Mono can cause your spleen to swell. The spleen is a fist-sized organ in the upper left abdomen that stores red blood cells. Injury to a swollen spleen can cause the spleen to rupture. This can cause life-threatening internal bleeding. To avoid this, do not play contact sports or perform strenuous activity for 8 weeks, or until cleared by your healthcare provider. A sharp blow could rupture a swollen spleen  Home care    Rest in bed until the fever and weakness have gone away.    Drink plenty of fluids, but avoid alcohol. Otherwise, you may eat a  regular diet.    Ask your healthcare provider about using over-the-counter medicines to treat symptoms such as fever, pain, or an itchy rash.    Over-the-counter throat lozenges may help soothe a sore throat. Gargling with warm salt water (1/2 teaspoon in 1 glass of warm water) may also be soothing to the throat.    You may return to work or school after the fever goes away and you are feeling better. Continue to follow any activity restrictions you have been given.  Preventing spread of the virus  To limit the spread of the virus, avoid exposing others to your saliva for at least 6 months after your illness (no kissing or sharing utensils, drinking glasses, or toothbrushes).  Follow-up care  Follow up with your healthcare provider within 1 to 2 weeks or as advised by our staff to be sure that there are no complications. If symptoms of extreme fatigue and swollen glands last longer than 6 months, see your healthcare provider for further testing.  When to seek medical advice  Call your healthcare provider right away if any of the following occur:    Excessive coughing    Yellow skin or eyes    Trouble swallowing  Call 911  Call 911 if any of the following occur:    Severe or worsening abdominal pain    Trouble breathing  Date Last Reviewed: 9/25/2015 2000-2017 [employer]. [address] [place] [address], [place] [address]. All rights reserved. This information is not intended as a substitute for professional medical care. Always follow your healthcare professional's instructions.            [facility]

## 2018-07-31 LAB
B BURGDOR IGG+IGM SER QL: 0.04 (ref 0–0.89)
BACTERIA SPEC CULT: NORMAL
Lab: NORMAL
SPECIMEN SOURCE: NORMAL

## 2018-07-31 NOTE — TELEPHONE ENCOUNTER
Urine pregnancy test ordered will have to be a lab only visit cannot run off the urine from yesterday as it was dated patient is aware of this patient states that she just wanted to check for pregnancy her last menstrual period was 2 weeks ago she is on birth control we will check a urine pregnancy test.    Celi Larose CNP

## 2018-08-06 ENCOUNTER — TELEPHONE (OUTPATIENT)
Dept: FAMILY MEDICINE | Facility: CLINIC | Age: 33
End: 2018-08-06

## 2018-08-06 DIAGNOSIS — D69.6 TEMPORARY LOW PLATELET COUNT (H): ICD-10-CM

## 2018-08-06 DIAGNOSIS — B27.90 MONONUCLEOSIS: ICD-10-CM

## 2018-08-06 LAB
ALBUMIN SERPL-MCNC: 3.1 G/DL (ref 3.4–5)
ALP SERPL-CCNC: 457 U/L (ref 40–150)
ALT SERPL W P-5'-P-CCNC: 571 U/L (ref 0–50)
AST SERPL W P-5'-P-CCNC: 280 U/L (ref 0–45)
BETA HCG QUAL IFA URINE: NEGATIVE
BILIRUB DIRECT SERPL-MCNC: 2 MG/DL (ref 0–0.2)
BILIRUB SERPL-MCNC: 2.5 MG/DL (ref 0.2–1.3)
ERYTHROCYTE [DISTWIDTH] IN BLOOD BY AUTOMATED COUNT: 12.8 % (ref 10–15)
HCT VFR BLD AUTO: 39.9 % (ref 35–47)
HGB BLD-MCNC: 13.3 G/DL (ref 11.7–15.7)
MCH RBC QN AUTO: 30 PG (ref 26.5–33)
MCHC RBC AUTO-ENTMCNC: 33.3 G/DL (ref 31.5–36.5)
MCV RBC AUTO: 90 FL (ref 78–100)
PLATELET # BLD AUTO: 236 10E9/L (ref 150–450)
PROT SERPL-MCNC: 7.7 G/DL (ref 6.8–8.8)
RBC # BLD AUTO: 4.43 10E12/L (ref 3.8–5.2)
WBC # BLD AUTO: 8.1 10E9/L (ref 4–11)

## 2018-08-06 PROCEDURE — 85027 COMPLETE CBC AUTOMATED: CPT | Performed by: NURSE PRACTITIONER

## 2018-08-06 PROCEDURE — 80076 HEPATIC FUNCTION PANEL: CPT | Performed by: NURSE PRACTITIONER

## 2018-08-06 PROCEDURE — 36415 COLL VENOUS BLD VENIPUNCTURE: CPT | Performed by: NURSE PRACTITIONER

## 2018-08-06 PROCEDURE — 84703 CHORIONIC GONADOTROPIN ASSAY: CPT | Performed by: NURSE PRACTITIONER

## 2018-08-06 NOTE — TELEPHONE ENCOUNTER
Pt in for lab appointment. Was on cephalexin since 7-30-18 for a uti and also states she has mono. States symptom wise she is feeling much better but has c/o extreme itching on the palms of her hand and bottoms of her feet. Also itching on abdomen. No rash present. Pt stopped antibiotic yesterday with concerns it was that but still itching today. Has labs done today. Pt offered an appointment today but declined stating she had to go to work. Celi please advise. Justa Betancourt RN

## 2018-08-06 NOTE — TELEPHONE ENCOUNTER
Okay to stop the Keflex her urine culture was negative could be a little reaction from the Keflex I do not think is an allergies related to the mono and antibiotic.  At this point try little Benadryl and if not improving or rash develops contact me will start on prednisone.    Also inform patient labs from data CBC the platelets have rebounded with 236 is within normal limits.    Awaiting hepatic panel which will not be back until tomorrow we will contact her with those results    Celi Larose CNP

## 2018-08-07 ENCOUNTER — RESULT FOLLOW UP (OUTPATIENT)
Dept: FAMILY MEDICINE | Facility: CLINIC | Age: 33
End: 2018-08-07

## 2018-08-07 DIAGNOSIS — B27.90 MONONUCLEOSIS: Primary | ICD-10-CM

## 2018-08-07 DIAGNOSIS — R74.8 ELEVATED LIVER ENZYMES: ICD-10-CM

## 2018-08-08 NOTE — PROGRESS NOTES
Patient notified of test results see telephone encounter   Patient feeling much better denies any abdominal pain states she is doing much better than she was last week.  Did review labs with her critical values of the liver enzymes plan is to have patient repeat those in 1-2 weeks patient is to take it easy she is not to do any strenuous exercise is not to do any contact sports she is to cut back on alcohol and Tylenol if she is taking them.  Patient is aware will have her lab only visit next week to see if they are trending down is to return if she has any abdominal pain or further concerns.    Celi Larose CNP

## 2018-08-13 DIAGNOSIS — R74.8 ELEVATED LIVER ENZYMES: ICD-10-CM

## 2018-08-13 LAB
ALBUMIN SERPL-MCNC: 3.2 G/DL (ref 3.4–5)
ALP SERPL-CCNC: 423 U/L (ref 40–150)
ALT SERPL W P-5'-P-CCNC: 230 U/L (ref 0–50)
AST SERPL W P-5'-P-CCNC: 109 U/L (ref 0–45)
BILIRUB DIRECT SERPL-MCNC: 0.6 MG/DL (ref 0–0.2)
BILIRUB SERPL-MCNC: 1 MG/DL (ref 0.2–1.3)
PROT SERPL-MCNC: 8.3 G/DL (ref 6.8–8.8)

## 2018-08-13 PROCEDURE — 80076 HEPATIC FUNCTION PANEL: CPT | Performed by: NURSE PRACTITIONER

## 2018-08-13 PROCEDURE — 36415 COLL VENOUS BLD VENIPUNCTURE: CPT | Performed by: NURSE PRACTITIONER

## 2018-08-14 ENCOUNTER — TELEPHONE (OUTPATIENT)
Dept: FAMILY MEDICINE | Facility: CLINIC | Age: 33
End: 2018-08-14

## 2018-08-14 DIAGNOSIS — R74.8 ELEVATED LIVER ENZYMES: Primary | ICD-10-CM

## 2018-08-15 NOTE — TELEPHONE ENCOUNTER
Patient has history of mono had elevated enzymes at the time of drop when mono was diagnosed.  Did have a repeat liver enzymes they are trending down patient is doing much better asymptomatic but enzymes remain mildly elevated recommend reevaluation of the enzymes for lab only appointment in 2 weeks.    Celi Larose CNP

## 2018-08-29 DIAGNOSIS — R74.8 ELEVATED LIVER ENZYMES: ICD-10-CM

## 2018-08-29 LAB
ALBUMIN SERPL-MCNC: 3.6 G/DL (ref 3.4–5)
ALP SERPL-CCNC: 176 U/L (ref 40–150)
ALT SERPL W P-5'-P-CCNC: 80 U/L (ref 0–50)
AST SERPL W P-5'-P-CCNC: 50 U/L (ref 0–45)
BILIRUB DIRECT SERPL-MCNC: 0.3 MG/DL (ref 0–0.2)
BILIRUB SERPL-MCNC: 0.7 MG/DL (ref 0.2–1.3)
PROT SERPL-MCNC: 8.2 G/DL (ref 6.8–8.8)

## 2018-08-29 PROCEDURE — 36415 COLL VENOUS BLD VENIPUNCTURE: CPT | Performed by: NURSE PRACTITIONER

## 2018-08-29 PROCEDURE — 80076 HEPATIC FUNCTION PANEL: CPT | Performed by: NURSE PRACTITIONER

## 2018-08-29 NOTE — PROGRESS NOTES
Please Notify Mahogany  of test results Hepatic function test mild elevated. Significantly decreased and near normal levels.  No further intervention.     Celi Larose CNP

## 2019-01-17 ENCOUNTER — OFFICE VISIT (OUTPATIENT)
Dept: FAMILY MEDICINE | Facility: CLINIC | Age: 34
End: 2019-01-17
Payer: COMMERCIAL

## 2019-01-17 VITALS
SYSTOLIC BLOOD PRESSURE: 130 MMHG | HEIGHT: 67 IN | OXYGEN SATURATION: 98 % | WEIGHT: 164 LBS | TEMPERATURE: 98.4 F | DIASTOLIC BLOOD PRESSURE: 82 MMHG | BODY MASS INDEX: 25.74 KG/M2 | HEART RATE: 130 BPM | RESPIRATION RATE: 18 BRPM

## 2019-01-17 DIAGNOSIS — Z13.1 SCREENING FOR DIABETES MELLITUS: ICD-10-CM

## 2019-01-17 DIAGNOSIS — B96.89 BACTERIAL VAGINOSIS: ICD-10-CM

## 2019-01-17 DIAGNOSIS — N76.0 BACTERIAL VAGINOSIS: ICD-10-CM

## 2019-01-17 DIAGNOSIS — Z00.01 ENCOUNTER FOR ROUTINE ADULT MEDICAL EXAM WITH ABNORMAL FINDINGS: Primary | ICD-10-CM

## 2019-01-17 DIAGNOSIS — Z12.4 SCREENING FOR MALIGNANT NEOPLASM OF CERVIX: ICD-10-CM

## 2019-01-17 DIAGNOSIS — Z13.21 ENCOUNTER FOR VITAMIN DEFICIENCY SCREENING: ICD-10-CM

## 2019-01-17 DIAGNOSIS — Z13.220 SCREENING FOR HYPERLIPIDEMIA: ICD-10-CM

## 2019-01-17 DIAGNOSIS — Z30.41 SURVEILLANCE OF PREVIOUSLY PRESCRIBED CONTRACEPTIVE PILL: ICD-10-CM

## 2019-01-17 DIAGNOSIS — R79.89 ELEVATED LFTS: ICD-10-CM

## 2019-01-17 DIAGNOSIS — N89.8 VAGINAL ODOR: ICD-10-CM

## 2019-01-17 DIAGNOSIS — R00.0 TACHYCARDIA: ICD-10-CM

## 2019-01-17 DIAGNOSIS — Z23 NEED FOR PROPHYLACTIC VACCINATION AND INOCULATION AGAINST INFLUENZA: ICD-10-CM

## 2019-01-17 DIAGNOSIS — Z11.3 SCREENING FOR STDS (SEXUALLY TRANSMITTED DISEASES): ICD-10-CM

## 2019-01-17 LAB
SPECIMEN SOURCE: ABNORMAL
WET PREP SPEC: ABNORMAL

## 2019-01-17 PROCEDURE — 87491 CHLMYD TRACH DNA AMP PROBE: CPT | Performed by: PHYSICIAN ASSISTANT

## 2019-01-17 PROCEDURE — G0145 SCR C/V CYTO,THINLAYER,RESCR: HCPCS | Performed by: PHYSICIAN ASSISTANT

## 2019-01-17 PROCEDURE — 99214 OFFICE O/P EST MOD 30 MIN: CPT | Mod: 25 | Performed by: PHYSICIAN ASSISTANT

## 2019-01-17 PROCEDURE — 99395 PREV VISIT EST AGE 18-39: CPT | Mod: 25 | Performed by: PHYSICIAN ASSISTANT

## 2019-01-17 PROCEDURE — 87591 N.GONORRHOEAE DNA AMP PROB: CPT | Performed by: PHYSICIAN ASSISTANT

## 2019-01-17 PROCEDURE — 90686 IIV4 VACC NO PRSV 0.5 ML IM: CPT | Performed by: PHYSICIAN ASSISTANT

## 2019-01-17 PROCEDURE — 87210 SMEAR WET MOUNT SALINE/INK: CPT | Performed by: PHYSICIAN ASSISTANT

## 2019-01-17 PROCEDURE — 90471 IMMUNIZATION ADMIN: CPT | Performed by: PHYSICIAN ASSISTANT

## 2019-01-17 PROCEDURE — 87624 HPV HI-RISK TYP POOLED RSLT: CPT | Performed by: PHYSICIAN ASSISTANT

## 2019-01-17 PROCEDURE — 93000 ELECTROCARDIOGRAM COMPLETE: CPT | Performed by: PHYSICIAN ASSISTANT

## 2019-01-17 RX ORDER — NORGESTIMATE AND ETHINYL ESTRADIOL 7DAYSX3 28
1 KIT ORAL DAILY
Qty: 84 TABLET | Refills: 4 | Status: SHIPPED | OUTPATIENT
Start: 2019-01-17 | End: 2019-04-29

## 2019-01-17 RX ORDER — METRONIDAZOLE 500 MG/1
500 TABLET ORAL 2 TIMES DAILY
Qty: 14 TABLET | Refills: 0 | Status: SHIPPED | OUTPATIENT
Start: 2019-01-17 | End: 2019-04-29

## 2019-01-17 ASSESSMENT — ANXIETY QUESTIONNAIRES
7. FEELING AFRAID AS IF SOMETHING AWFUL MIGHT HAPPEN: SEVERAL DAYS
2. NOT BEING ABLE TO STOP OR CONTROL WORRYING: NEARLY EVERY DAY
GAD7 TOTAL SCORE: 19
1. FEELING NERVOUS, ANXIOUS, OR ON EDGE: NEARLY EVERY DAY
5. BEING SO RESTLESS THAT IT IS HARD TO SIT STILL: NEARLY EVERY DAY
IF YOU CHECKED OFF ANY PROBLEMS ON THIS QUESTIONNAIRE, HOW DIFFICULT HAVE THESE PROBLEMS MADE IT FOR YOU TO DO YOUR WORK, TAKE CARE OF THINGS AT HOME, OR GET ALONG WITH OTHER PEOPLE: SOMEWHAT DIFFICULT
3. WORRYING TOO MUCH ABOUT DIFFERENT THINGS: NEARLY EVERY DAY
6. BECOMING EASILY ANNOYED OR IRRITABLE: NEARLY EVERY DAY

## 2019-01-17 ASSESSMENT — MIFFLIN-ST. JEOR: SCORE: 1491.53

## 2019-01-17 ASSESSMENT — PATIENT HEALTH QUESTIONNAIRE - PHQ9
SUM OF ALL RESPONSES TO PHQ QUESTIONS 1-9: 9
5. POOR APPETITE OR OVEREATING: NEARLY EVERY DAY

## 2019-01-17 ASSESSMENT — PAIN SCALES - GENERAL: PAINLEVEL: NO PAIN (0)

## 2019-01-17 NOTE — PATIENT INSTRUCTIONS
Please schedule a fasting lab appointment (nothing to eat or drink 9 hours prior except water and/or your medications) at your earliest convenience.  - you may schedule at [facility] on the weekend if you prefer or as early as 7 AM here     Try ortho tricyclen for oral contraceptive pill to see if libido improves    Schedule follow up with cardiology for rapid heart rate- schedule approximately one week after labs obtained  [facility] ([phone]).      Take flagyl twice a day for 7 days- follow up with us if vaginal odor does not improve with treatment              Preventive Health Recommendations  Female Ages 26 - 39  Yearly exam:   See your health care provider every year in order to    Review health changes.     Discuss preventive care.      Review your medicines if you your doctor has prescribed any.    Until age 30: Get a Pap test every three years (more often if you have had an abnormal result).    After age 30: Talk to your doctor about whether you should have a Pap test every 3 years or have a Pap test with HPV screening every 5 years.   You do not need a Pap test if your uterus was removed (hysterectomy) and you have not had cancer.  You should be tested each year for STDs (sexually transmitted diseases), if you're at risk.   Talk to your provider about how often to have your cholesterol checked.  If you are at risk for diabetes, you should have a diabetes test (fasting glucose).  Shots: Get a flu shot each year. Get a tetanus shot every 10 years.   Nutrition:     Eat at least 5 servings of fruits and vegetables each day.    Eat whole-grain bread, whole-wheat pasta and brown rice instead of white grains and rice.    Get adequate Calcium and Vitamin D.     Lifestyle    Exercise at least 150 minutes a week (30 minutes a day, 5 days of the week). This will help you control your weight and prevent disease.    Limit alcohol to one drink per day.    No  smoking.     Wear sunscreen to prevent skin cancer.    See your dentist every six months for an exam and cleaning.
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
## 2019-01-17 NOTE — PROGRESS NOTES

## 2019-01-18 ASSESSMENT — ANXIETY QUESTIONNAIRES: GAD7 TOTAL SCORE: 19

## 2019-01-20 LAB
C TRACH DNA SPEC QL NAA+PROBE: NEGATIVE
N GONORRHOEA DNA SPEC QL NAA+PROBE: NEGATIVE
SPECIMEN SOURCE: NORMAL
SPECIMEN SOURCE: NORMAL

## 2019-01-21 NOTE — RESULT ENCOUNTER NOTE
Tavares Vides  Your gonorrhea and chlamydia tests were negative.   Please call or MyChart my office with any questions or concerns.    Shanice Aponte, PAC

## 2019-01-22 LAB
COPATH REPORT: NORMAL
PAP: NORMAL

## 2019-01-23 LAB
FINAL DIAGNOSIS: NORMAL
HPV HR 12 DNA CVX QL NAA+PROBE: NEGATIVE
HPV16 DNA SPEC QL NAA+PROBE: NEGATIVE
HPV18 DNA SPEC QL NAA+PROBE: NEGATIVE
SPECIMEN DESCRIPTION: NORMAL
SPECIMEN SOURCE CVX/VAG CYTO: NORMAL

## 2019-04-29 ENCOUNTER — OFFICE VISIT (OUTPATIENT)
Dept: OBGYN | Facility: CLINIC | Age: 34
End: 2019-04-29
Payer: COMMERCIAL

## 2019-04-29 VITALS
HEART RATE: 109 BPM | SYSTOLIC BLOOD PRESSURE: 145 MMHG | DIASTOLIC BLOOD PRESSURE: 96 MMHG | WEIGHT: 172.3 LBS | BODY MASS INDEX: 26.99 KG/M2

## 2019-04-29 DIAGNOSIS — Z13.220 SCREENING FOR HYPERLIPIDEMIA: ICD-10-CM

## 2019-04-29 DIAGNOSIS — N92.1 METRORRHAGIA: Primary | ICD-10-CM

## 2019-04-29 DIAGNOSIS — R79.89 ELEVATED LFTS: ICD-10-CM

## 2019-04-29 DIAGNOSIS — Z31.69 ENCOUNTER FOR PRECONCEPTION CONSULTATION: ICD-10-CM

## 2019-04-29 DIAGNOSIS — Z13.1 SCREENING FOR DIABETES MELLITUS: ICD-10-CM

## 2019-04-29 DIAGNOSIS — Z13.21 ENCOUNTER FOR VITAMIN DEFICIENCY SCREENING: ICD-10-CM

## 2019-04-29 DIAGNOSIS — R00.0 TACHYCARDIA: ICD-10-CM

## 2019-04-29 LAB
ALBUMIN SERPL-MCNC: 4 G/DL (ref 3.4–5)
ALP SERPL-CCNC: 69 U/L (ref 40–150)
ALT SERPL W P-5'-P-CCNC: 23 U/L (ref 0–50)
ANION GAP SERPL CALCULATED.3IONS-SCNC: 7 MMOL/L (ref 3–14)
AST SERPL W P-5'-P-CCNC: 22 U/L (ref 0–45)
BASOPHILS # BLD AUTO: 0 10E9/L (ref 0–0.2)
BASOPHILS NFR BLD AUTO: 0.1 %
BILIRUB SERPL-MCNC: 0.4 MG/DL (ref 0.2–1.3)
BUN SERPL-MCNC: 8 MG/DL (ref 7–30)
CALCIUM SERPL-MCNC: 8.8 MG/DL (ref 8.5–10.1)
CHLORIDE SERPL-SCNC: 104 MMOL/L (ref 94–109)
CHOLEST SERPL-MCNC: 252 MG/DL
CO2 SERPL-SCNC: 27 MMOL/L (ref 20–32)
CREAT SERPL-MCNC: 0.85 MG/DL (ref 0.52–1.04)
DIFFERENTIAL METHOD BLD: NORMAL
EOSINOPHIL # BLD AUTO: 0.1 10E9/L (ref 0–0.7)
EOSINOPHIL NFR BLD AUTO: 0.7 %
ERYTHROCYTE [DISTWIDTH] IN BLOOD BY AUTOMATED COUNT: 11.9 % (ref 10–15)
ESTRADIOL SERPL-MCNC: 48 PG/ML
FSH SERPL-ACNC: 5 IU/L
GFR SERPL CREATININE-BSD FRML MDRD: >90 ML/MIN/{1.73_M2}
GLUCOSE SERPL-MCNC: 82 MG/DL (ref 70–99)
HCG SERPL QL: NEGATIVE
HCT VFR BLD AUTO: 41.2 % (ref 35–47)
HDLC SERPL-MCNC: 78 MG/DL
HGB BLD-MCNC: 13.6 G/DL (ref 11.7–15.7)
IMM GRANULOCYTES # BLD: 0 10E9/L (ref 0–0.4)
IMM GRANULOCYTES NFR BLD: 0.1 %
LDLC SERPL CALC-MCNC: 152 MG/DL
LH SERPL-ACNC: 6 IU/L
LYMPHOCYTES # BLD AUTO: 3.2 10E9/L (ref 0.8–5.3)
LYMPHOCYTES NFR BLD AUTO: 46 %
MCH RBC QN AUTO: 28.9 PG (ref 26.5–33)
MCHC RBC AUTO-ENTMCNC: 33 G/DL (ref 31.5–36.5)
MCV RBC AUTO: 88 FL (ref 78–100)
MONOCYTES # BLD AUTO: 0.5 10E9/L (ref 0–1.3)
MONOCYTES NFR BLD AUTO: 7.1 %
NEUTROPHILS # BLD AUTO: 3.2 10E9/L (ref 1.6–8.3)
NEUTROPHILS NFR BLD AUTO: 46 %
NONHDLC SERPL-MCNC: 174 MG/DL
PLATELET # BLD AUTO: 335 10E9/L (ref 150–450)
POTASSIUM SERPL-SCNC: 3.5 MMOL/L (ref 3.4–5.3)
PROLACTIN SERPL-MCNC: 9 UG/L (ref 3–27)
PROT SERPL-MCNC: 8.4 G/DL (ref 6.8–8.8)
RBC # BLD AUTO: 4.71 10E12/L (ref 3.8–5.2)
SODIUM SERPL-SCNC: 138 MMOL/L (ref 133–144)
TRIGL SERPL-MCNC: 108 MG/DL
TSH SERPL DL<=0.005 MIU/L-ACNC: 0.58 MU/L (ref 0.4–4)
WBC # BLD AUTO: 6.9 10E9/L (ref 4–11)

## 2019-04-29 PROCEDURE — 36415 COLL VENOUS BLD VENIPUNCTURE: CPT | Performed by: OBSTETRICS & GYNECOLOGY

## 2019-04-29 PROCEDURE — 82306 VITAMIN D 25 HYDROXY: CPT | Performed by: OBSTETRICS & GYNECOLOGY

## 2019-04-29 PROCEDURE — 83001 ASSAY OF GONADOTROPIN (FSH): CPT | Performed by: OBSTETRICS & GYNECOLOGY

## 2019-04-29 PROCEDURE — 84146 ASSAY OF PROLACTIN: CPT | Performed by: OBSTETRICS & GYNECOLOGY

## 2019-04-29 PROCEDURE — 99203 OFFICE O/P NEW LOW 30 MIN: CPT | Performed by: OBSTETRICS & GYNECOLOGY

## 2019-04-29 PROCEDURE — 84443 ASSAY THYROID STIM HORMONE: CPT | Performed by: OBSTETRICS & GYNECOLOGY

## 2019-04-29 PROCEDURE — 86481 TB AG RESPONSE T-CELL SUSP: CPT | Performed by: OBSTETRICS & GYNECOLOGY

## 2019-04-29 PROCEDURE — 80061 LIPID PANEL: CPT | Performed by: OBSTETRICS & GYNECOLOGY

## 2019-04-29 PROCEDURE — 82670 ASSAY OF TOTAL ESTRADIOL: CPT | Performed by: OBSTETRICS & GYNECOLOGY

## 2019-04-29 PROCEDURE — 83002 ASSAY OF GONADOTROPIN (LH): CPT | Performed by: OBSTETRICS & GYNECOLOGY

## 2019-04-29 PROCEDURE — 84703 CHORIONIC GONADOTROPIN ASSAY: CPT | Performed by: OBSTETRICS & GYNECOLOGY

## 2019-04-29 PROCEDURE — 85025 COMPLETE CBC W/AUTO DIFF WBC: CPT | Performed by: OBSTETRICS & GYNECOLOGY

## 2019-04-29 PROCEDURE — 80053 COMPREHEN METABOLIC PANEL: CPT | Performed by: OBSTETRICS & GYNECOLOGY

## 2019-04-29 NOTE — PROGRESS NOTES
OB-GYN Problem-Oriented Visit or Consultation      Mahogany Sainz is a 33 year old year old who presents with a chief complaint of abnormal uterine bleeding. Referred by self.   Patient's last menstrual period was 04/06/2019 (approximate).    HPI:  Menses q 28-30 days x 4-5 days. She recently switched her OCP to Ortho Tri-Cyclen three months ago. For the last three weeks the patient has had abnormal uterine bleeding. She states that she had missed one pill, but took it later in the day. After two weeks of bleeding patient admitted to stopping her OCP. She has been off OCP for 1 week. Patient denies any cramping or other abdominal pain. She is sexually active with one male partner and they are not using protection. Patient is not opposed to becoming pregnant. Patient was recently seen by her PCP for her annual physical, and her  exam, STD screening, and Pap/HRHPV were normal. Patient has one six year old son, Kyrie. Patient smokes electrionic cigarettes. Here with partner.     Past medical, obstetrical, surgical, family and social history reviewed and as noted or updated in chart.     Allergies, meds and supplements are as noted or updated in chart.      ROS:   Systems reviewed include:  constitutional, cardiac, gastrointestinal, genitourinary, psychological, hematologic/lymphatic and endocrine.    These systems were negative for significant symptoms except for the following additional: none; see HPI.    EXAM:  VS as noted. BP (!) 145/96 (BP Location: Right arm, Patient Position: Chair, Cuff Size: Adult Regular)   Pulse 109   Wt 78.2 kg (172 lb 4.8 oz)   LMP 04/06/2019 (Approximate)   BMI 26.99 kg/m    Constitutionally normal.     Exam not repeated at this time.    Assessment: Abnormal uterine bleeding. Suspect benign BTB on OC.     I reviewed the condition, causes, differential diagnosis, prognosis, evaluation and management considerations and options.  Questions answered and information given. See orders.      Plan and Recommendations: We recommended the patient have lab work performed to further guide the management plan. We will check her FSH, LH, prolactin, estradiol, TSH, CBC, CMP. If bleeding continues into next week, we advised her to call or RTC for follow up with probable exam and ultrasound.     Basic preconception advice given. Encouraged vaping cessation.     Total encounter time (physician together with patient) = 30min. Direct counseling, education and care coordination time (physician together with patient) = 20min.     I was present with the student who participated in the service and in the documentation of the note. I have verified the history and personally performed the physical exam and medical decision making. I agree with the assessment and plan of care as documented in the note.  BRODERICK Covington-S2  Del Leigh MD          A/P:  Mahogany was seen today for abnormal uterine bleeding.    Diagnoses and all orders for this visit:    Metrorrhagia  -     Follicle stimulating hormone  -     Lutropin  -     Estradiol  -     Prolactin  -     HCG qualitative Blood  -     Quantiferon TB Gold Plus    Tachycardia  -     JUST IN CASE  -     CBC with platelets differential  -     Comprehensive metabolic panel  -     TSH with free T4 reflex    Screening for diabetes mellitus  -     Comprehensive metabolic panel    Elevated LFTs  -     Comprehensive metabolic panel    Encounter for vitamin deficiency screening  -     Vitamin D Deficiency    Screening for hyperlipidemia  -     Lipid panel reflex to direct LDL Fasting    Encounter for preconception consultation

## 2019-04-29 NOTE — RESULT ENCOUNTER NOTE
Tavares Vides  Your electrolytes, blood sugar, kidney function and liver function were normal.    Your thyroid function was normal.   Your cholesterol is a bit high.  Poor diet, genetics and being overweight can contribute to this.  Maintaining a healthy diet with lean proteins, whole grains and healthy fats such as olive oil as well as regular exercise and maintaining an appropriate weight all contribute to healthier cholesterol levels.     Your blood counts were normal.   Please call or MyChart my office with any questions or concerns.    Shanice Aponte, PAC

## 2019-04-29 NOTE — PATIENT INSTRUCTIONS
If you have any questions regarding your visit, Please contact your care team.     [employer] [facility]: [phone]  [facility] CLINIC HOURS TELEPHONE NUMBER       Del Leigh M.D.        Jenny-    Chris Conner-Medical Assistant       Monday-[place]  8:00a.m-4:45 p.m  Tuesday-[place]  9:00a.m-4:00 p.m  Wednesday-[place] 8:00a.m-4:45 p.m.  Thursday-[place]  8:00a.m-4:45 p.m.  Friday-[place]  8:00a.m-4:45 p.m. [facility]  [address] [place] [address]  [phone] ask for [facility]  [phone] Fax  Imaging [phone]    [facility]  [address] [place] [address] [place] [address]  [phone]    [facility]  [address] [place] [address]  [phone] ask [facility]  [phone] Fax  Imaging [phone]     Urgent Care locations:    [facility] Monday-Friday  5 pm - 9 pm  Saturday and Sunday   9 am - 5 pm  Monday-Friday   11 am - 9 pm  Saturday and Sunday   9 am - 5 pm   [phone]   If you need a medication refill, please contact your pharmacy. Please allow 3 business days for your refill to be completed.  As always, Thank you for trusting us with your healthcare needs!

## 2019-04-30 LAB — DEPRECATED CALCIDIOL+CALCIFEROL SERPL-MC: 45 UG/L (ref 20–75)

## 2019-04-30 NOTE — RESULT ENCOUNTER NOTE
Tavares Vides  Your vitamin D level was normal.   Please call or MyChart my office with any questions or concerns.    Shanice Aponte, PAC

## 2019-05-01 LAB
GAMMA INTERFERON BACKGROUND BLD IA-ACNC: 0.04 IU/ML
M TB IFN-G BLD-IMP: NEGATIVE
M TB IFN-G CD4+ BCKGRND COR BLD-ACNC: >10 IU/ML
MITOGEN IGNF BCKGRD COR BLD-ACNC: 0.01 IU/ML
MITOGEN IGNF BCKGRD COR BLD-ACNC: 0.05 IU/ML

## 2020-02-23 ENCOUNTER — HEALTH MAINTENANCE LETTER (OUTPATIENT)
Age: 35
End: 2020-02-23

## 2020-12-06 ENCOUNTER — HEALTH MAINTENANCE LETTER (OUTPATIENT)
Age: 35
End: 2020-12-06

## 2021-04-11 ENCOUNTER — HEALTH MAINTENANCE LETTER (OUTPATIENT)
Age: 36
End: 2021-04-11

## 2021-09-26 ENCOUNTER — HEALTH MAINTENANCE LETTER (OUTPATIENT)
Age: 36
End: 2021-09-26

## 2022-05-07 ENCOUNTER — HEALTH MAINTENANCE LETTER (OUTPATIENT)
Age: 37
End: 2022-05-07

## 2023-04-22 ENCOUNTER — HEALTH MAINTENANCE LETTER (OUTPATIENT)
Age: 38
End: 2023-04-22

## 2023-06-02 ENCOUNTER — HEALTH MAINTENANCE LETTER (OUTPATIENT)
Age: 38
End: 2023-06-02